# Patient Record
Sex: MALE | Race: WHITE | Employment: FULL TIME | URBAN - METROPOLITAN AREA
[De-identification: names, ages, dates, MRNs, and addresses within clinical notes are randomized per-mention and may not be internally consistent; named-entity substitution may affect disease eponyms.]

---

## 2019-08-14 PROCEDURE — 88302 TISSUE EXAM BY PATHOLOGIST: CPT | Performed by: SURGERY

## 2024-10-14 ENCOUNTER — OFFICE VISIT (OUTPATIENT)
Facility: LOCATION | Age: 29
End: 2024-10-14
Payer: COMMERCIAL

## 2024-10-14 DIAGNOSIS — J34.89 NASAL OBSTRUCTION: ICD-10-CM

## 2024-10-14 DIAGNOSIS — R06.83 SNORING: Primary | ICD-10-CM

## 2024-10-14 NOTE — H&P
Garett Bennett is a 29 year old male. No chief complaint on file.    HPI:   29 y M presenting with nasal obstruction. He reports seasonal allergies and symptoms are worse in the spring and fall. The laterality of his nasal obstruction switches and is worse on the right today. He also complains of post nasal discharge but denies nasal discharge and facial pain/pressure (spring and fall). He takes OTC antihistamines. He uses flonase a couple of times/month and sudafed when his nasal congestion is exacerbated. He notes that he has otalgia, sore throat and muffled hearing when symptoms are their worst.     Also reports snoring per his wife who says it has sounded as if he was choking, This has reportedly gotten worse over the years. He denies recent weight changes. Patient feels rested upon awakening and doesn't fall asleep throughout the day. Denies frequent headaches.        Current Outpatient Medications   Medication Sig Dispense Refill    ARIPIPRAZOLE 5 MG Oral Tab TAKE 1 TABLET(5 MG) BY MOUTH DAILY 30 tablet 0    escitalopram 20 MG Oral Tab Take 1 tablet (20 mg total) by mouth daily. 30 tablet 5      Past Medical History:    Anxiety    Bipolar disorder (manic depression) ([facility])    Depression      Social History:  Social History     Socioeconomic History    Marital status: Single   Tobacco Use    Smoking status: Former     Current packs/day: 0.00     Types: Cigarettes     Quit date: 2019     Years since quittin.4    Smokeless tobacco: Never   Vaping Use    Vaping status: Former   Substance and Sexual Activity    Alcohol use: No    Drug use: No      No past surgical history on file.      REVIEW OF SYSTEMS:   GENERAL HEALTH: feels well otherwise  GENERAL : denies fever, chills, sweats, weight loss, weight gain  SKIN: denies any unusual skin lesions or rashes  RESPIRATORY: denies shortness of breath with exertion  NEURO: denies headaches    EXAM:   There were no vitals taken for this visit.    System  Findings Details   Constitutional  Overall appearance - Normal.   Head/Face  Facial features -- Normal. Skull - Normal.   Eyes  Sclera white, pupils equal and round   Ears  External ears normal in appearance, EACs with partially obstructive cerumen, visualized TMs intact, no evidence of middle ear effusion   Nose  External nose normal in appearance, nares patent, mid septum deviated to right, right inferior turbinate hypertrophy, clear mucus bilaterally   Throat  Posterior pharynx clear, uvula midline, tonsils 1+   Oral cavity  Lips normal in appearance, mucous membranes clear, no masses, FOM soft, tongue without lesions   Neck  Trachea midline, no lymphadenopathy, no masses   Neurological  Memory - Normal. Cranial nerves - Cranial nerves II through XII grossly intact.       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     - Educated on importance of daily flonase (had been using it intermittently)  - Educated on nasal irrigations  - Sleep study ordered  - Follow up in 3-4 months    The patient indicates understanding of these issues and agrees to the plan.      Nasrin Pineda MD  10/13/2024  8:53 PM

## 2024-11-19 ENCOUNTER — TELEPHONE (OUTPATIENT)
Dept: SLEEP CENTER | Age: 29
End: 2024-11-19

## 2025-02-25 ENCOUNTER — APPOINTMENT (OUTPATIENT)
Dept: DERMATOLOGY | Age: 30
End: 2025-02-25